# Patient Record
Sex: MALE | Race: WHITE
[De-identification: names, ages, dates, MRNs, and addresses within clinical notes are randomized per-mention and may not be internally consistent; named-entity substitution may affect disease eponyms.]

---

## 2018-04-05 ENCOUNTER — HOSPITAL ENCOUNTER (INPATIENT)
Dept: HOSPITAL 61 - ER | Age: 58
LOS: 2 days | Discharge: LEFT BEFORE DISCHARGE | DRG: 377 | End: 2018-04-07
Attending: FAMILY MEDICINE | Admitting: FAMILY MEDICINE
Payer: SELF-PAY

## 2018-04-05 DIAGNOSIS — E83.42: ICD-10-CM

## 2018-04-05 DIAGNOSIS — Z82.49: ICD-10-CM

## 2018-04-05 DIAGNOSIS — D69.6: ICD-10-CM

## 2018-04-05 DIAGNOSIS — K92.0: Primary | ICD-10-CM

## 2018-04-05 DIAGNOSIS — D63.8: ICD-10-CM

## 2018-04-05 DIAGNOSIS — E78.00: ICD-10-CM

## 2018-04-05 DIAGNOSIS — Z71.41: ICD-10-CM

## 2018-04-05 DIAGNOSIS — F17.210: ICD-10-CM

## 2018-04-05 DIAGNOSIS — E78.5: ICD-10-CM

## 2018-04-05 DIAGNOSIS — N40.0: ICD-10-CM

## 2018-04-05 DIAGNOSIS — N17.0: ICD-10-CM

## 2018-04-05 DIAGNOSIS — E87.8: ICD-10-CM

## 2018-04-05 DIAGNOSIS — F10.20: ICD-10-CM

## 2018-04-05 DIAGNOSIS — Z91.19: ICD-10-CM

## 2018-04-05 DIAGNOSIS — E11.9: ICD-10-CM

## 2018-04-05 DIAGNOSIS — J44.9: ICD-10-CM

## 2018-04-05 DIAGNOSIS — K21.9: ICD-10-CM

## 2018-04-05 DIAGNOSIS — F10.231: ICD-10-CM

## 2018-04-05 DIAGNOSIS — K76.0: ICD-10-CM

## 2018-04-05 DIAGNOSIS — I10: ICD-10-CM

## 2018-04-05 LAB
ACETAMIN: < 2 MCG/ML (ref 10–30)
ADD MAN DIFF?: NO
ALBUMIN SERPL-MCNC: 3.3 G/DL (ref 3.4–5)
ALP SERPL-CCNC: 80 U/L (ref 46–116)
ALT (SGPT): 134 U/L (ref 16–63)
AMPHETAMINE/METHAMPHETAMINE: (no result)
ANION GAP SERPL CALC-SCNC: 24 MMOL/L (ref 6–14)
AST SERPL-CCNC: 143 U/L (ref 15–37)
BACTERIA,URINE: 0 /HPF
BARBITURATES: (no result)
BASO #: 0 X10^3/UL (ref 0–0.2)
BASO %: 0 % (ref 0–3)
BENZODIAZEPINES: (no result)
BILIRUB DIRECT SERPL-MCNC: 0.4 MG/DL (ref 0–0.2)
BILIRUBIN,URINE: (no result)
BLOOD UREA NITROGEN: 24 MG/DL (ref 8–26)
CALCIUM: 8.7 MG/DL (ref 8.5–10.1)
CANNABINOIDS: (no result)
CHLORIDE: 89 MMOL/L (ref 98–107)
CK SERPL-CCNC: 314 U/L (ref 39–308)
CKMB INDEX: 1.2 % (ref 0–4)
CKMB MASS: 3.9 NG/ML (ref 0–3.6)
CLARITY,URINE: CLEAR
CO2 SERPL-SCNC: 19 MMOL/L (ref 21–32)
COCAINE: (no result)
COLOR,URINE: YELLOW
CREAT SERPL-MCNC: 1.3 MG/DL (ref 0.7–1.3)
EOS #: 0 X10^3/UL (ref 0–0.7)
EOS %: 0 % (ref 0–3)
ETHANOL, URINE: (no result)
ETHANOL: 16 MG/DL (ref 0–10)
FECAL OB PT: NEGATIVE
GFR SERPLBLD BASED ON 1.73 SQ M-ARVRAT: 56.7 ML/MIN
GLUCOSE SERPL-MCNC: 114 MG/DL (ref 70–99)
GLUCOSE,URINE: NEGATIVE MG/DL
HCG SERPL-ACNC: 4.9 X10^3/UL (ref 4–11)
HEMATOCRIT: 40.8 % (ref 39–53)
HEMOGLOBIN: 13.9 G/DL (ref 13–17.5)
HYALINE CASTS, URINE: (no result) /HPF
LIPASE: 337 U/L (ref 73–393)
LYMPH #: 0.6 X10^3/UL (ref 1–4.8)
LYMPH %: 12 % (ref 24–48)
MAGNESIUM: 1.3 MG/DL (ref 1.8–2.4)
MEAN CORPUSCULAR HEMOGLOBIN: 32 PG (ref 25–35)
MEAN CORPUSCULAR HGB CONC: 34 G/DL (ref 31–37)
MEAN CORPUSCULAR VOLUME: 94 FL (ref 79–100)
METHADONE: (no result)
MONO #: 0.8 X10^3/UL (ref 0–1.1)
MONO %: 16 % (ref 0–9)
NEG OBC FOB: (no result)
NEUT #: 3.6 X10^3UL (ref 1.8–7.7)
NEUT %: 72 % (ref 31–73)
NITRITE,URINE: NEGATIVE
OPIATES: (no result)
PH,URINE: 6
PHENCYCLIDINE: (no result)
PLATELET COUNT: 136 X10^3/UL (ref 140–400)
POS OBC FOB: (no result)
POTASSIUM SERPL-SCNC: 3.6 MMOL/L (ref 3.5–5.1)
PROTEIN,URINE: >=300 MG/DL
RBC,URINE: (no result) /HPF (ref 0–2)
RED BLOOD COUNT: 4.34 X10^6/UL (ref 4.3–5.7)
RED CELL DISTRIBUTION WIDTH: 14.2 % (ref 11.5–14.5)
SALIC: 3.7 MG/DL (ref 2.8–20)
SODIUM: 132 MMOL/L (ref 136–145)
SPECIFIC GRAVITY,URINE: 1.02
SQUAMOUS EPITHELIAL CELL,UR: (no result) /LPF
TOTAL BILIRUBIN: 1.1 MG/DL (ref 0.2–1)
TOTAL PROTEIN: 7.3 G/DL (ref 6.4–8.2)
TROPONINI: < 0.017 NG/ML (ref 0–0.06)
UROBILINOGEN,URINE: 1 MG/DL
WBC,URINE: (no result) /HPF (ref 0–4)

## 2018-04-05 PROCEDURE — 86900 BLOOD TYPING SEROLOGIC ABO: CPT

## 2018-04-05 PROCEDURE — 36415 COLL VENOUS BLD VENIPUNCTURE: CPT

## 2018-04-05 PROCEDURE — 80048 BASIC METABOLIC PNL TOTAL CA: CPT

## 2018-04-05 PROCEDURE — 87641 MR-STAPH DNA AMP PROBE: CPT

## 2018-04-05 PROCEDURE — 80307 DRUG TEST PRSMV CHEM ANLYZR: CPT

## 2018-04-05 PROCEDURE — 83690 ASSAY OF LIPASE: CPT

## 2018-04-05 PROCEDURE — 85025 COMPLETE CBC W/AUTO DIFF WBC: CPT

## 2018-04-05 PROCEDURE — 96375 TX/PRO/DX INJ NEW DRUG ADDON: CPT

## 2018-04-05 PROCEDURE — 80329 ANALGESICS NON-OPIOID 1 OR 2: CPT

## 2018-04-05 PROCEDURE — 99285 EMERGENCY DEPT VISIT HI MDM: CPT

## 2018-04-05 PROCEDURE — 80076 HEPATIC FUNCTION PANEL: CPT

## 2018-04-05 PROCEDURE — 93005 ELECTROCARDIOGRAM TRACING: CPT

## 2018-04-05 PROCEDURE — 82274 ASSAY TEST FOR BLOOD FECAL: CPT

## 2018-04-05 PROCEDURE — 86850 RBC ANTIBODY SCREEN: CPT

## 2018-04-05 PROCEDURE — 96365 THER/PROPH/DIAG IV INF INIT: CPT

## 2018-04-05 PROCEDURE — 74177 CT ABD & PELVIS W/CONTRAST: CPT

## 2018-04-05 PROCEDURE — 86901 BLOOD TYPING SEROLOGIC RH(D): CPT

## 2018-04-05 PROCEDURE — 83735 ASSAY OF MAGNESIUM: CPT

## 2018-04-05 PROCEDURE — 84484 ASSAY OF TROPONIN QUANT: CPT

## 2018-04-05 PROCEDURE — 81001 URINALYSIS AUTO W/SCOPE: CPT

## 2018-04-05 PROCEDURE — 82553 CREATINE MB FRACTION: CPT

## 2018-04-05 RX ADMIN — ONDANSETRON 1 MG: 2 INJECTION INTRAMUSCULAR; INTRAVENOUS at 20:35

## 2018-04-05 RX ADMIN — FOLIC ACID 1 MLS/HR: 5 INJECTION, SOLUTION INTRAMUSCULAR; INTRAVENOUS; SUBCUTANEOUS at 20:45

## 2018-04-05 RX ADMIN — IOHEXOL 1 ML: 300 INJECTION, SOLUTION INTRAVENOUS at 21:12

## 2018-04-05 RX ADMIN — BACITRACIN 1 MLS/HR: 5000 INJECTION, POWDER, FOR SOLUTION INTRAMUSCULAR at 20:35

## 2018-04-05 RX ADMIN — FAMOTIDINE 1 MG: 10 INJECTION, SOLUTION INTRAVENOUS at 20:35

## 2018-04-06 LAB
ADD MAN DIFF?: NO
ANION GAP SERPL CALC-SCNC: 15 MMOL/L (ref 6–14)
BASO #: 0 X10^3/UL (ref 0–0.2)
BASO %: 1 % (ref 0–3)
BLOOD UREA NITROGEN: 19 MG/DL (ref 8–26)
CALCIUM: 7.7 MG/DL (ref 8.5–10.1)
CHLORIDE: 98 MMOL/L (ref 98–107)
CO2 SERPL-SCNC: 23 MMOL/L (ref 21–32)
CREAT SERPL-MCNC: 1.1 MG/DL (ref 0.7–1.3)
EOS #: 0.1 X10^3/UL (ref 0–0.7)
EOS %: 1 % (ref 0–3)
GFR SERPLBLD BASED ON 1.73 SQ M-ARVRAT: 68.8 ML/MIN
GLUCOSE SERPL-MCNC: 69 MG/DL (ref 70–99)
HCG SERPL-ACNC: 4.9 X10^3/UL (ref 4–11)
HEMATOCRIT: 34.9 % (ref 39–53)
HEMOGLOBIN: 11.9 G/DL (ref 13–17.5)
LYMPH #: 1.5 X10^3/UL (ref 1–4.8)
LYMPH %: 31 % (ref 24–48)
MEAN CORPUSCULAR HEMOGLOBIN: 32 PG (ref 25–35)
MEAN CORPUSCULAR HGB CONC: 34 G/DL (ref 31–37)
MEAN CORPUSCULAR VOLUME: 94 FL (ref 79–100)
MONO #: 0.8 X10^3/UL (ref 0–1.1)
MONO %: 17 % (ref 0–9)
NEUT #: 2.5 X10^3UL (ref 1.8–7.7)
NEUT %: 51 % (ref 31–73)
PLATELET COUNT: 116 X10^3/UL (ref 140–400)
POTASSIUM SERPL-SCNC: 3.9 MMOL/L (ref 3.5–5.1)
RED BLOOD COUNT: 3.7 X10^6/UL (ref 4.3–5.7)
RED CELL DISTRIBUTION WIDTH: 14.4 % (ref 11.5–14.5)
SODIUM: 136 MMOL/L (ref 136–145)

## 2018-04-06 RX ADMIN — PANTOPRAZOLE SODIUM 1 MG: 40 TABLET, DELAYED RELEASE ORAL at 10:26

## 2018-04-06 RX ADMIN — FOLIC ACID 1 MLS/HR: 5 INJECTION, SOLUTION INTRAMUSCULAR; INTRAVENOUS; SUBCUTANEOUS at 20:31

## 2018-04-06 RX ADMIN — BACITRACIN 1 MLS/HR: 5000 INJECTION, POWDER, FOR SOLUTION INTRAMUSCULAR at 07:52

## 2018-04-06 RX ADMIN — BACITRACIN 1 MLS/HR: 5000 INJECTION, POWDER, FOR SOLUTION INTRAMUSCULAR at 16:40

## 2018-04-06 RX ADMIN — MULTIPLE VITAMINS W/ MINERALS TAB 1 TAB: TAB at 09:00

## 2018-04-06 RX ADMIN — BACITRACIN 1 MLS/HR: 5000 INJECTION, POWDER, FOR SOLUTION INTRAMUSCULAR at 00:36

## 2018-04-07 LAB
ADD MAN DIFF?: NO
ANION GAP SERPL CALC-SCNC: 10 MMOL/L (ref 6–14)
BASO #: 0 X10^3/UL (ref 0–0.2)
BASO %: 0 % (ref 0–3)
BLOOD UREA NITROGEN: 11 MG/DL (ref 8–26)
CALCIUM: 8.4 MG/DL (ref 8.5–10.1)
CHLORIDE: 96 MMOL/L (ref 98–107)
CO2 SERPL-SCNC: 28 MMOL/L (ref 21–32)
CREAT SERPL-MCNC: 1 MG/DL (ref 0.7–1.3)
EOS #: 0.1 X10^3/UL (ref 0–0.7)
EOS %: 2 % (ref 0–3)
GFR SERPLBLD BASED ON 1.73 SQ M-ARVRAT: 76.7 ML/MIN
GLUCOSE SERPL-MCNC: 144 MG/DL (ref 70–99)
HCG SERPL-ACNC: 5.1 X10^3/UL (ref 4–11)
HEMATOCRIT: 37.6 % (ref 39–53)
HEMOGLOBIN: 12.9 G/DL (ref 13–17.5)
LYMPH #: 1 X10^3/UL (ref 1–4.8)
LYMPH %: 21 % (ref 24–48)
MEAN CORPUSCULAR HEMOGLOBIN: 33 PG (ref 25–35)
MEAN CORPUSCULAR HGB CONC: 34 G/DL (ref 31–37)
MEAN CORPUSCULAR VOLUME: 95 FL (ref 79–100)
MONO #: 0.9 X10^3/UL (ref 0–1.1)
MONO %: 18 % (ref 0–9)
NEUT #: 3 X10^3UL (ref 1.8–7.7)
NEUT %: 59 % (ref 31–73)
PLATELET COUNT: 124 X10^3/UL (ref 140–400)
POTASSIUM SERPL-SCNC: 3.3 MMOL/L (ref 3.5–5.1)
RED BLOOD COUNT: 3.97 X10^6/UL (ref 4.3–5.7)
RED CELL DISTRIBUTION WIDTH: 14.3 % (ref 11.5–14.5)
SODIUM: 134 MMOL/L (ref 136–145)

## 2018-04-07 RX ADMIN — HYDROCODONE BITARTRATE AND ACETAMINOPHEN 1 TAB: 5; 325 TABLET ORAL at 02:15

## 2018-04-07 RX ADMIN — PANTOPRAZOLE SODIUM 1 MG: 40 TABLET, DELAYED RELEASE ORAL at 08:40

## 2018-04-07 RX ADMIN — MULTIPLE VITAMINS W/ MINERALS TAB 1 TAB: TAB at 08:40

## 2021-06-06 ENCOUNTER — HOSPITAL ENCOUNTER (EMERGENCY)
Dept: HOSPITAL 61 - ER | Age: 61
Discharge: LEFT BEFORE DISCHARGE | End: 2021-06-06
Payer: SELF-PAY

## 2021-06-06 VITALS — DIASTOLIC BLOOD PRESSURE: 84 MMHG | SYSTOLIC BLOOD PRESSURE: 173 MMHG

## 2021-06-06 VITALS — HEIGHT: 73 IN | BODY MASS INDEX: 31.85 KG/M2 | WEIGHT: 240.3 LBS

## 2021-06-06 DIAGNOSIS — F12.90: ICD-10-CM

## 2021-06-06 DIAGNOSIS — F10.229: ICD-10-CM

## 2021-06-06 DIAGNOSIS — J44.9: ICD-10-CM

## 2021-06-06 DIAGNOSIS — R06.02: Primary | ICD-10-CM
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
## 2021-06-06 DIAGNOSIS — F17.200: ICD-10-CM

## 2021-06-06 DIAGNOSIS — I10: ICD-10-CM

## 2021-06-06 DIAGNOSIS — E11.9: ICD-10-CM

## 2021-06-06 DIAGNOSIS — F43.21: ICD-10-CM

## 2021-06-06 LAB
ALBUMIN SERPL-MCNC: 3.4 G/DL (ref 3.4–5)
ALBUMIN/GLOB SERPL: 0.9 {RATIO} (ref 1–1.7)
ALP SERPL-CCNC: 105 U/L (ref 46–116)
ALT SERPL-CCNC: 50 U/L (ref 16–63)
ANION GAP SERPL CALC-SCNC: 12 MMOL/L (ref 6–14)
AST SERPL-CCNC: 44 U/L (ref 15–37)
BASOPHILS # BLD AUTO: 0.1 X10^3/UL (ref 0–0.2)
BASOPHILS NFR BLD: 1 % (ref 0–3)
BILIRUB SERPL-MCNC: 0.2 MG/DL (ref 0.2–1)
BUN SERPL-MCNC: 14 MG/DL (ref 8–26)
BUN/CREAT SERPL: 12 (ref 6–20)
CALCIUM SERPL-MCNC: 9.4 MG/DL (ref 8.5–10.1)
CHLORIDE SERPL-SCNC: 100 MMOL/L (ref 98–107)
CO2 SERPL-SCNC: 26 MMOL/L (ref 21–32)
CREAT SERPL-MCNC: 1.2 MG/DL (ref 0.7–1.3)
EOSINOPHIL NFR BLD: 0.2 X10^3/UL (ref 0–0.7)
EOSINOPHIL NFR BLD: 3 % (ref 0–3)
ERYTHROCYTE [DISTWIDTH] IN BLOOD BY AUTOMATED COUNT: 16.5 % (ref 11.5–14.5)
GFR SERPLBLD BASED ON 1.73 SQ M-ARVRAT: 61.6 ML/MIN
GLUCOSE SERPL-MCNC: 139 MG/DL (ref 70–99)
HCT VFR BLD CALC: 39.7 % (ref 39–53)
HGB BLD-MCNC: 13.6 G/DL (ref 13–17.5)
LYMPHOCYTES # BLD: 1.9 X10^3/UL (ref 1–4.8)
LYMPHOCYTES NFR BLD AUTO: 29 % (ref 24–48)
MCH RBC QN AUTO: 31 PG (ref 25–35)
MCHC RBC AUTO-ENTMCNC: 34 G/DL (ref 31–37)
MCV RBC AUTO: 90 FL (ref 79–100)
MONO #: 0.7 X10^3/UL (ref 0–1.1)
MONOCYTES NFR BLD: 11 % (ref 0–9)
NEUT #: 3.5 X10^3/UL (ref 1.8–7.7)
NEUTROPHILS NFR BLD AUTO: 55 % (ref 31–73)
PLATELET # BLD AUTO: 535 X10^3/UL (ref 140–400)
POTASSIUM SERPL-SCNC: 4.8 MMOL/L (ref 3.5–5.1)
PROT SERPL-MCNC: 7.4 G/DL (ref 6.4–8.2)
RBC # BLD AUTO: 4.42 X10^6/UL (ref 4.3–5.7)
SODIUM SERPL-SCNC: 138 MMOL/L (ref 136–145)
WBC # BLD AUTO: 6.4 X10^3/UL (ref 4–11)

## 2021-06-06 PROCEDURE — 85025 COMPLETE CBC W/AUTO DIFF WBC: CPT

## 2021-06-06 PROCEDURE — 93005 ELECTROCARDIOGRAM TRACING: CPT

## 2021-06-06 PROCEDURE — 99285 EMERGENCY DEPT VISIT HI MDM: CPT

## 2021-06-06 PROCEDURE — 84484 ASSAY OF TROPONIN QUANT: CPT

## 2021-06-06 PROCEDURE — 83880 ASSAY OF NATRIURETIC PEPTIDE: CPT

## 2021-06-06 PROCEDURE — 36415 COLL VENOUS BLD VENIPUNCTURE: CPT

## 2021-06-06 PROCEDURE — 71045 X-RAY EXAM CHEST 1 VIEW: CPT

## 2021-06-06 PROCEDURE — 80053 COMPREHEN METABOLIC PANEL: CPT

## 2021-06-06 NOTE — EKG
[facility]

              [address] [place] [address]

Test Date:    2021               Test Time:    13:36:18

Pat Name:     MATT ALCARAZ          Department:   

Patient ID:   [id number]           Room:          

Gender:       M                        Technician:   

:          1960               Requested By: QUIANA STAFFORD

Order Number: [id number]           Reading MD:     

                                 Measurements

Intervals                              Axis          

Rate:         101                      P:            34

UT:           150                      QRS:          -64

QRSD:         120                      T:            24

QT:           362                                    

QTc:          470                                    

                           Interpretive Statements

SINUS TACHYCARDIA

ABNORMAL LEFT AXIS DEVIATION

INCOMPLETE RIGHT BUNDLE BRANCH BLOCK

RVH WITH REPOLARIZATION ABNORMALITY

QRS(T) CONTOUR ABNORMALITY

CONSISTENT WITH ANTEROSEPTAL INFARCT

AGE UNDETERMINED

CONSISTENT WITH INFERIOR INFARCT

PROBABLY OLD

ABNORMAL ECG

RI6.01

No previous ECG available for comparison

## 2021-06-06 NOTE — PHYS DOC
Past Medical History


Past Medical History:  Alcoholism, COPD, Diabetes-Type II, Hypertension, Seizure


Additional Past Medical Histor:  NONCOMPLIANT


Past Surgical History:  Other


Additional Past Surgical Histo:  LEG SX, ANKLE SX, POOR HISTORIAN


Smoking Status:  Current Every Day Smoker


Alcohol Use:  Heavy


Drug Use:  None





General Adult


EDM:


Chief Complaint:  ALCOHOL INTOXICATION





HPI:


HPI:





Patient is a 60  year old male with history of COPD on oxygen as needed at home,

current smoker, hypertension, diabetes type 2, seizures, alcoholism, who 

presents to the ED today to be evaluated for shortness of breath.  Patient 

states his significant female partner  yesterday, this was a suspicious 

death, police are investigating.  Patient states today he got arrested by police

for drunk driving, during the arrest he was trying to get out of his vehicle and

was accused of assaulting  because he accidentally kicked the 

officer, he states at that point he had shortness of breath and requested to be 

brought to the ED.





Patient arrives in the ED very talkative, he states he has been drinking since 

yesterday.  Patient also states he smoked a joint. At some point he started 

crying.  He stopped crying at times and starts laughing and talking again.  

Denies any chest pain.





Review of Systems:


Review of Systems:


Constitutional:   Denies fever or chills. []


Eyes:   Denies change in visual acuity. []


HENT:   Denies nasal congestion or sore throat. [] 


Respiratory:   Reports shortness of breath, and cough 


Cardiovascular:   Denies chest pain or edema. [] 


GI:   Denies abdominal pain, nausea, vomiting, bloody stools or diarrhea. [] 


:  Denies dysuria. [] 


Musculoskeletal:   Denies back pain or joint pain. [] 


Integument:   Denies rash. [] 


Neurologic:   Denies headache, focal weakness or sensory changes. []  


Psychiatric: Reports sadness, grief





Heart Score:


C/O Chest Pain:  N/A


Risk Factors:


Risk Factors:  DM, Current or recent (<one month) smoker, HTN, HLP, family 

history of CAD, obesity.


Risk Scores:


Score 0 - 3:  2.5% MACE over next 6 weeks - Discharge Home


Score 4 - 6:  20.3% MACE over next 6 weeks - Admit for Clinical Observation


Score 7 - 10:  72.7% MACE over next 6 weeks - Early Invasive Strategies





Allergies:


Allergies:





Allergies








Coded Allergies Type Severity Reaction Last Updated Verified


 


  No Known Drug Allergies    17 No











Physical Exam:


PE:





Constitutional: Well developed, well nourished, no acute distress, non-toxic 

appearance. []


HENT: Normocephalic, atraumatic, bilateral external ears normal, oropharynx 

moist, no oral exudates, nose normal. []


Eyes: PERRLA, EOMI, conjunctiva normal, no discharge. [] 


Neck: Normal range of motion, no tenderness, supple, no stridor. [] 


Cardiovascular:Heart rate regular rhythm, no murmur []


Lungs & Thorax: Diminished breath sounds to posterior lung bases, patient 

coughing


Abdomen: Bowel sounds normal, soft, no tenderness, no masses, no pulsatile 

masses. [] 


Skin: Warm, dry, no erythema, no rash. [] 


Back: No tenderness, no CVA tenderness. [] 


Extremities: No tenderness, no cyanosis, no clubbing, ROM intact, no edema. [] 


Neurologic: Alert and oriented X 3, normal motor function, normal sensory 

function, no focal deficits noted. []


Psychologic: Appears to have mixed feelings, at times he is sad and crying when 

he starts talking and laughing.





Current Patient Data:


Labs:





                                Laboratory Tests








Test


 21


13:25


 


White Blood Count


 6.4 x10^3/uL


(4.0-11.0)


 


Red Blood Count


 4.42 x10^6/uL


(4.30-5.70)


 


Hemoglobin


 13.6 g/dL


(13.0-17.5)


 


Hematocrit


 39.7 %


(39.0-53.0)


 


Mean Corpuscular Volume


 90 fL ()





 


Mean Corpuscular Hemoglobin 31 pg (25-35)  


 


Mean Corpuscular Hemoglobin


Concent 34 g/dL


(31-37)


 


Red Cell Distribution Width


 16.5 %


(11.5-14.5)  H


 


Platelet Count


 535 x10^3/uL


(140-400)  H


 


Neutrophils (%) (Auto) 55 % (31-73)  


 


Lymphocytes (%) (Auto) 29 % (24-48)  


 


Monocytes (%) (Auto) 11 % (0-9)  H


 


Eosinophils (%) (Auto) 3 % (0-3)  


 


Basophils (%) (Auto) 1 % (0-3)  


 


Neutrophils # (Auto)


 3.5 x10^3/uL


(1.8-7.7)


 


Lymphocytes # (Auto)


 1.9 x10^3/uL


(1.0-4.8)


 


Monocytes # (Auto)


 0.7 x10^3/uL


(0.0-1.1)


 


Eosinophils # (Auto)


 0.2 x10^3/uL


(0.0-0.7)


 


Basophils # (Auto)


 0.1 x10^3/uL


(0.0-0.2)


 


Sodium Level


 138 mmol/L


(136-145)


 


Potassium Level


 4.8 mmol/L


(3.5-5.1)


 


Chloride Level


 100 mmol/L


()


 


Carbon Dioxide Level


 26 mmol/L


(21-32)


 


Anion Gap 12 (6-14)  


 


Blood Urea Nitrogen


 14 mg/dL


(8-26)


 


Creatinine


 1.2 mg/dL


(0.7-1.3)


 


Estimated GFR


(Cockcroft-Gault) 61.6  





 


BUN/Creatinine Ratio 12 (6-20)  


 


Glucose Level


 139 mg/dL


(70-99)  H


 


Calcium Level


 9.4 mg/dL


(8.5-10.1)


 


Total Bilirubin


 0.2 mg/dL


(0.2-1.0)


 


Aspartate Amino Transferase


(AST) 44 U/L (15-37)


H


 


Alanine Aminotransferase (ALT)


 50 U/L (16-63)





 


Alkaline Phosphatase


 105 U/L


()


 


Troponin I Quantitative


 0.018 ng/mL


(0.000-0.055)


 


Total Protein


 7.4 g/dL


(6.4-8.2)


 


Albumin


 3.4 g/dL


(3.4-5.0)


 


Albumin/Globulin Ratio


 0.9 (1.0-1.7)


L


 


Ethyl Alcohol Level


 236 mg/dL


(0-10)  H





                                Laboratory Tests


21 13:25








                                Laboratory Tests


21 13:25











EKG:


EK interpreted by Dr. Bonilla sinus tachycardia heart rate 101 no STEMI []





Radiology/Procedures:


Radiology/Procedures:


[]REASON: SOA


PROCEDURE: PORTABLE CHEST 1V








INDICATION: Reason: SOA / Spl. Instructions:  / History: 





COMPARISON: 2017





FINDINGS:





Single view of chest obtained.


Cardiac silhouette is mildly prominent in size. Calcified nodule at the right 

lung base again seen. There is some linear opacities at the bilateral lung 

bases.








IMPRESSION:





*  Mild linear opacities at lung bases which could be from atelectasis or early 

infiltrate.





Electronically signed by: Rema Shelley MD (2021 1:41 PM) [id number]














DICTATED and SIGNED BY:     REMA SHELLEY MD


DATE:     21 [id number]





Course & Med Decision Making:


Course & Med Decision Making


Shoulder to have any shoulder pertinent Labs and Imaging studies reviewed. (See 

chart for details)





This is a 60-year-old male patient presenting to the ED today complaining of 

shortness of breath.  Patient is being investigated for suspicious death of the 

female partner.  See HPI





EKG is negative, chest x-ray noted for possible atelectasis on early infiltrate,

 CBC CMP and troponin with no acute findings.  Blood alcohol level was 236.





Cheryle from [facility] came and talked to patient.  It is my understanding he refused 

any help, he states he has been to rehab many times and it did not work.  





Patient's work-up is still pending, he requested to be discharged AGAINST ME

DICAL ADVICE.  He states he wants to go home where he is comfortable.  I went to

 the room and talked to patient in the presence of the RN.  Patient at this 

point was alert oriented x4 able to make his own decisions.  Gave him the risk 

of leaving AMA including death and disability.  He states he understands the 

risk but does not want to be in the ED anymore.  He stood up and walked away 

with no difficulties.





Dragon Disclaimer:


Dragon Disclaimer:


This electronic medical record was generated, in whole or in part, using a voice

 recognition dictation system.





Departure


Departure


Impression:  


   Primary Impression:  


   Alcohol intoxication


   Qualified Codes:  F10.929 - Alcohol use, unspecified with intoxication, 

   unspecified


   Additional Impressions:  


   Shortness of breath


   Marijuana use


   Smoking addiction


   Grief


Disposition:   LEFT AGAINST MEDICAL ADVICE


Condition:  STABLE


Referrals:  


NO PCP (PCP)











QUIANA STAFFORD             2021 14:10

## 2021-06-06 NOTE — RAD
INDICATION: Reason: SOA / Spl. Instructions:  / History: 



COMPARISON: October 21, 2017



FINDINGS:



Single view of chest obtained.

Cardiac silhouette is mildly prominent in size. Calcified nodule at the right lung base again seen. T
here is some linear opacities at the bilateral lung bases.





IMPRESSION:



*  Mild linear opacities at lung bases which could be from atelectasis or early infiltrate.



Electronically signed by: Adama Shelley MD (6/6/2021 1:41 PM) [id number]